# Patient Record
Sex: MALE | Race: WHITE | NOT HISPANIC OR LATINO | Employment: OTHER | RURAL
[De-identification: names, ages, dates, MRNs, and addresses within clinical notes are randomized per-mention and may not be internally consistent; named-entity substitution may affect disease eponyms.]

---

## 2020-11-15 ENCOUNTER — HISTORICAL (OUTPATIENT)
Dept: ADMINISTRATIVE | Facility: HOSPITAL | Age: 40
End: 2020-11-15

## 2021-06-15 ENCOUNTER — HOSPITAL ENCOUNTER (EMERGENCY)
Facility: HOSPITAL | Age: 41
Discharge: HOME | End: 2021-06-15

## 2021-06-15 VITALS
DIASTOLIC BLOOD PRESSURE: 80 MMHG | RESPIRATION RATE: 17 BRPM | TEMPERATURE: 98 F | SYSTOLIC BLOOD PRESSURE: 108 MMHG | BODY MASS INDEX: 27.28 KG/M2 | HEIGHT: 68 IN | OXYGEN SATURATION: 95 % | HEART RATE: 84 BPM | WEIGHT: 180 LBS

## 2021-06-15 DIAGNOSIS — L08.9 SOFT TISSUE INFECTION: Primary | ICD-10-CM

## 2021-06-15 LAB
BASOPHILS # BLD AUTO: 0.1 K/UL (ref 0–0.2)
BASOPHILS NFR BLD AUTO: 1.6 % (ref 0–1)
DIFFERENTIAL METHOD BLD: ABNORMAL
EOSINOPHIL # BLD AUTO: 0.46 K/UL (ref 0–0.5)
EOSINOPHIL NFR BLD AUTO: 7.1 % (ref 1–4)
ERYTHROCYTE [DISTWIDTH] IN BLOOD BY AUTOMATED COUNT: 12.8 % (ref 11.5–14.5)
HCT VFR BLD AUTO: 46.2 % (ref 40–54)
HGB BLD-MCNC: 16 G/DL (ref 13.5–18)
LYMPHOCYTES # BLD AUTO: 1.98 K/UL (ref 1–4.8)
LYMPHOCYTES NFR BLD AUTO: 30.7 % (ref 27–41)
MCH RBC QN AUTO: 31.9 PG (ref 27–31)
MCHC RBC AUTO-ENTMCNC: 34.6 G/DL (ref 32–36)
MCV RBC AUTO: 92.2 FL (ref 80–96)
MONOCYTES # BLD AUTO: 0.67 K/UL (ref 0–0.8)
MONOCYTES NFR BLD AUTO: 10.4 % (ref 2–6)
MPC BLD CALC-MCNC: 10.1 FL (ref 9.4–12.4)
NEUTROPHILS # BLD AUTO: 3.24 K/UL (ref 1.8–7.7)
NEUTROPHILS NFR BLD AUTO: 50.2 % (ref 53–65)
PLATELET # BLD AUTO: 231 K/UL (ref 150–400)
RBC # BLD AUTO: 5.01 M/UL (ref 4.6–6.2)
WBC # BLD AUTO: 6.45 K/UL (ref 4.5–11)

## 2021-06-15 PROCEDURE — 85025 COMPLETE CBC W/AUTO DIFF WBC: CPT | Performed by: FAMILY MEDICINE

## 2021-06-15 PROCEDURE — 99283 EMERGENCY DEPT VISIT LOW MDM: CPT | Mod: ,,, | Performed by: FAMILY MEDICINE

## 2021-06-15 PROCEDURE — 36415 COLL VENOUS BLD VENIPUNCTURE: CPT | Performed by: FAMILY MEDICINE

## 2021-06-15 PROCEDURE — 25000003 PHARM REV CODE 250: Performed by: FAMILY MEDICINE

## 2021-06-15 PROCEDURE — 99283 PR EMERGENCY DEPT VISIT,LEVEL III: ICD-10-PCS | Mod: ,,, | Performed by: FAMILY MEDICINE

## 2021-06-15 PROCEDURE — 96372 THER/PROPH/DIAG INJ SC/IM: CPT

## 2021-06-15 PROCEDURE — 99284 EMERGENCY DEPT VISIT MOD MDM: CPT | Mod: 25

## 2021-06-15 RX ORDER — CLINDAMYCIN HYDROCHLORIDE 150 MG/1
300 CAPSULE ORAL 4 TIMES DAILY
Qty: 56 CAPSULE | Refills: 0 | Status: SHIPPED | OUTPATIENT
Start: 2021-06-15 | End: 2021-06-22

## 2021-06-15 RX ORDER — CLINDAMYCIN PHOSPHATE 150 MG/ML
600 INJECTION, SOLUTION INTRAVENOUS
Status: COMPLETED | OUTPATIENT
Start: 2021-06-15 | End: 2021-06-15

## 2021-06-15 RX ADMIN — CLINDAMYCIN PHOSPHATE 600 MG: 150 INJECTION, SOLUTION INTRAMUSCULAR; INTRAVENOUS at 08:06

## 2021-08-23 ENCOUNTER — HOSPITAL ENCOUNTER (EMERGENCY)
Facility: HOSPITAL | Age: 41
Discharge: HOME | End: 2021-08-23

## 2021-08-23 VITALS
WEIGHT: 185 LBS | SYSTOLIC BLOOD PRESSURE: 135 MMHG | HEART RATE: 86 BPM | RESPIRATION RATE: 20 BRPM | TEMPERATURE: 98 F | OXYGEN SATURATION: 96 % | DIASTOLIC BLOOD PRESSURE: 87 MMHG | HEIGHT: 68 IN | BODY MASS INDEX: 28.04 KG/M2

## 2021-08-23 DIAGNOSIS — A64 SEXUALLY TRANSMITTED DISEASE (STD): Primary | ICD-10-CM

## 2021-08-23 PROCEDURE — 99283 EMERGENCY DEPT VISIT LOW MDM: CPT | Mod: ,,, | Performed by: FAMILY MEDICINE

## 2021-08-23 PROCEDURE — 99284 EMERGENCY DEPT VISIT MOD MDM: CPT

## 2021-08-23 PROCEDURE — 96372 THER/PROPH/DIAG INJ SC/IM: CPT

## 2021-08-23 PROCEDURE — 63600175 PHARM REV CODE 636 W HCPCS: Performed by: FAMILY MEDICINE

## 2021-08-23 PROCEDURE — 25000003 PHARM REV CODE 250: Performed by: FAMILY MEDICINE

## 2021-08-23 PROCEDURE — 99283 PR EMERGENCY DEPT VISIT,LEVEL III: ICD-10-PCS | Mod: ,,, | Performed by: FAMILY MEDICINE

## 2021-08-23 RX ORDER — CEFTRIAXONE 1 G/1
1 INJECTION, POWDER, FOR SOLUTION INTRAMUSCULAR; INTRAVENOUS ONCE
Status: COMPLETED | OUTPATIENT
Start: 2021-08-23 | End: 2021-08-23

## 2021-08-23 RX ORDER — METRONIDAZOLE 500 MG/1
2000 TABLET ORAL ONCE
Qty: 4 TABLET | Refills: 0 | Status: SHIPPED | OUTPATIENT
Start: 2021-08-23 | End: 2021-08-23

## 2021-08-23 RX ORDER — LIDOCAINE HYDROCHLORIDE 10 MG/ML
2.1 INJECTION INFILTRATION; PERINEURAL ONCE
Status: COMPLETED | OUTPATIENT
Start: 2021-08-23 | End: 2021-08-23

## 2021-08-23 RX ORDER — VALACYCLOVIR HYDROCHLORIDE 1 G/1
1000 TABLET, FILM COATED ORAL EVERY 12 HOURS
Qty: 14 TABLET | Refills: 0 | Status: SHIPPED | OUTPATIENT
Start: 2021-08-23 | End: 2024-03-05

## 2021-08-23 RX ORDER — KETOROLAC TROMETHAMINE 30 MG/ML
60 INJECTION, SOLUTION INTRAMUSCULAR; INTRAVENOUS
Status: COMPLETED | OUTPATIENT
Start: 2021-08-23 | End: 2021-08-23

## 2021-08-23 RX ORDER — DOXYCYCLINE 100 MG/1
100 CAPSULE ORAL EVERY 12 HOURS
Qty: 28 CAPSULE | Refills: 0 | Status: SHIPPED | OUTPATIENT
Start: 2021-08-23 | End: 2021-09-06

## 2021-08-23 RX ADMIN — CEFTRIAXONE 1 G: 1 INJECTION, POWDER, FOR SOLUTION INTRAMUSCULAR; INTRAVENOUS at 08:08

## 2021-08-23 RX ADMIN — LIDOCAINE HYDROCHLORIDE 2.1 ML: 10 INJECTION, SOLUTION INFILTRATION; PERINEURAL at 08:08

## 2021-08-23 RX ADMIN — KETOROLAC TROMETHAMINE 60 MG: 30 INJECTION, SOLUTION INTRAMUSCULAR; INTRAVENOUS at 08:08

## 2021-08-24 ENCOUNTER — TELEPHONE (OUTPATIENT)
Dept: EMERGENCY MEDICINE | Facility: HOSPITAL | Age: 41
End: 2021-08-24

## 2022-03-31 ENCOUNTER — HOSPITAL ENCOUNTER (EMERGENCY)
Facility: HOSPITAL | Age: 42
Discharge: HOME | End: 2022-04-01
Payer: COMMERCIAL

## 2022-03-31 DIAGNOSIS — H57.12 LEFT EYE PAIN: Primary | ICD-10-CM

## 2022-03-31 PROCEDURE — 99283 EMERGENCY DEPT VISIT LOW MDM: CPT

## 2022-03-31 PROCEDURE — 99283 EMERGENCY DEPT VISIT LOW MDM: CPT | Mod: ,,,

## 2022-03-31 PROCEDURE — 99283 PR EMERGENCY DEPT VISIT,LEVEL III: ICD-10-PCS | Mod: ,,,

## 2022-04-01 ENCOUNTER — TELEPHONE (OUTPATIENT)
Dept: EMERGENCY MEDICINE | Facility: HOSPITAL | Age: 42
End: 2022-04-01
Payer: COMMERCIAL

## 2022-04-01 VITALS
HEIGHT: 68 IN | HEART RATE: 76 BPM | OXYGEN SATURATION: 98 % | RESPIRATION RATE: 20 BRPM | DIASTOLIC BLOOD PRESSURE: 85 MMHG | BODY MASS INDEX: 29.55 KG/M2 | SYSTOLIC BLOOD PRESSURE: 129 MMHG | TEMPERATURE: 98 F | WEIGHT: 195 LBS

## 2022-04-01 PROCEDURE — 25000003 PHARM REV CODE 250

## 2022-04-01 RX ORDER — ERYTHROMYCIN 5 MG/G
OINTMENT OPHTHALMIC
Qty: 1 EACH | Refills: 0 | Status: SHIPPED | OUTPATIENT
Start: 2022-04-01 | End: 2022-09-12 | Stop reason: CLARIF

## 2022-04-01 RX ORDER — TETRACAINE HYDROCHLORIDE 5 MG/ML
2 SOLUTION OPHTHALMIC
Status: COMPLETED | OUTPATIENT
Start: 2022-04-01 | End: 2022-04-01

## 2022-04-01 RX ORDER — ERYTHROMYCIN 5 MG/G
OINTMENT OPHTHALMIC
Status: COMPLETED | OUTPATIENT
Start: 2022-04-01 | End: 2022-04-01

## 2022-04-01 RX ORDER — HYDROCODONE BITARTRATE AND ACETAMINOPHEN 5; 325 MG/1; MG/1
1 TABLET ORAL
Status: COMPLETED | OUTPATIENT
Start: 2022-04-01 | End: 2022-04-01

## 2022-04-01 RX ADMIN — ERYTHROMYCIN: 5 OINTMENT OPHTHALMIC at 12:04

## 2022-04-01 RX ADMIN — HYDROCODONE BITARTRATE AND ACETAMINOPHEN 1 TABLET: 5; 325 TABLET ORAL at 01:04

## 2022-04-01 RX ADMIN — TETRACAINE HYDROCHLORIDE 2 DROP: 5 SOLUTION OPHTHALMIC at 12:04

## 2022-04-01 RX ADMIN — FLUORESCEIN SODIUM 1 EACH: 1 STRIP OPHTHALMIC at 12:04

## 2022-04-01 NOTE — ED TRIAGE NOTES
Patient works in an auto body shop, reports no injury or foreign body to eye, no issues when he went to bed at 2000, woke 20-25 minutes ago with eye burning an d loss of vision, reports it feels like something is cutting his eye

## 2022-04-01 NOTE — DISCHARGE INSTRUCTIONS
Avoid rubbing eye. Follow-up with ophthalmology tomorrow as discussed. Return to Emergency Department for any new or worsening symptoms.

## 2022-04-01 NOTE — ED PROVIDER NOTES
"Encounter Date: 3/31/2022       History     Chief Complaint   Patient presents with    Eye Problem     Left eye pain that woke him 20 minutes ago, reports no known injury or foreign body, reports he can only see red in that eye     40 yo WM presents to ED with c/o left eye pain and foreign body sensation that started 30 mins PTA. Reports he was asleep when pain woke him up. He states he sees "red" out of left eye and nothing else. 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 States he was wearing eye protective equipment. He wears glasses at baseline and does not have glasses with him in ED. He is not wearing contacts at present. He denies any headache, N/V.     The history is provided by the patient.     Review of patient's allergies indicates:  No Known Allergies  Past Medical History:   Diagnosis Date    Seizures      Past Surgical History:   Procedure Laterality Date    FACIAL RECONSTRUCTION SURGERY       History reviewed. No pertinent family history.  Social History     Tobacco Use    Smoking status: Current Every Day Smoker     Packs/day: 0.50    Smokeless tobacco: Never Used   Substance Use Topics    Alcohol use: Yes     Comment: occasionally    Drug use: Never     Review of Systems   Constitutional: Negative for fever.   HENT: Negative for sore throat.    Eyes: Positive for pain and visual disturbance.   Respiratory: Negative for shortness of breath.    Cardiovascular: Negative for chest pain.   Gastrointestinal: Negative for nausea.   Genitourinary: Negative for dysuria.   Musculoskeletal: Negative for back pain.   Skin: Negative for rash.   Neurological: Negative for weakness.   Hematological: Does not bruise/bleed easily.       Physical Exam     Initial Vitals [03/31/22 2349]   BP Pulse Resp Temp SpO2   (!) 137/98 85 20 97.9 °F (36.6 °C) 96 %      MAP       --         Physical Exam    Vitals reviewed.  Constitutional: He appears well-developed and well-nourished.   Eyes: EOM are normal. Pupils are equal, round, and " reactive to light. Lids are everted and swept, no foreign bodies found. Left conjunctiva is injected.       Neck:   Normal range of motion.  Cardiovascular: Normal rate, regular rhythm and normal heart sounds.   Pulmonary/Chest: Breath sounds normal. No respiratory distress.   Musculoskeletal:         General: No tenderness or edema. Normal range of motion.      Cervical back: Normal range of motion.     Neurological: He is alert and oriented to person, place, and time. He has normal strength.   Skin: Skin is warm and dry.   Psychiatric: He has a normal mood and affect.         Medical Screening Exam   See Full Note    ED Course   Procedures  Labs Reviewed - No data to display       Imaging Results    None          Medications   TETRAcaine HCl (PF) 0.5 % Drop 2 drop (2 drops Left Eye Given 4/1/22 0004)   fluorescein ophthalmic strip 1 each (1 each Left Eye Given 4/1/22 0004)   erythromycin 5 mg/gram (0.5 %) ophthalmic ointment ( Left Eye Given 4/1/22 0040)   HYDROcodone-acetaminophen 5-325 mg per tablet 1 tablet (1 tablet Oral Given 4/1/22 0107)     Medical Decision Making:   ED Management:  The left eye was numbed with tetracaine and examined under woods lamp with fluorescein. Woods lamp in ED is not working to full extent. There was no obvious sign of corneal abrasion visualized. Patient reports after tetracaine was administered, the pain was relieved and he was able to open and see out of his left eye again. He continued to report a foreign body sensation to the upper, outer portion of the left eye under the left eyelid. Patient wears glasses at baseline and does not have glasses with him in ED, so visual acuity is unreliable. However, he is now able to see individuals in the room and answered appropriately when asked how many fingers were held in front of him.  The eye was irrigated with eye rinse solution in ED. Erythromycin ointment and protective eye patch was placed onto left eye. Will have patient f/u with  ophthalmology tomorrow.  Home care and strict return to ED precautions discussed. Patient voiced understanding.                   Clinical Impression:   Final diagnoses:  [H57.12] Left eye pain (Primary)          ED Disposition Condition    Discharge Stable        ED Prescriptions     Medication Sig Dispense Start Date End Date Auth. Provider    erythromycin (ROMYCIN) ophthalmic ointment Place a 1/2 inch ribbon of ointment into the left lower eyelid 3 times daily. 1 each 4/1/2022  JOSÉ ANTONIO Quinonez        Follow-up Information    None          JOSÉ ANTONIO Quinonez  04/01/22 1292

## 2022-09-12 ENCOUNTER — HOSPITAL ENCOUNTER (EMERGENCY)
Facility: HOSPITAL | Age: 42
Discharge: HOME | End: 2022-09-12

## 2022-09-12 VITALS
BODY MASS INDEX: 30.01 KG/M2 | TEMPERATURE: 98 F | HEIGHT: 68 IN | SYSTOLIC BLOOD PRESSURE: 129 MMHG | OXYGEN SATURATION: 97 % | RESPIRATION RATE: 18 BRPM | HEART RATE: 79 BPM | WEIGHT: 198 LBS | DIASTOLIC BLOOD PRESSURE: 76 MMHG

## 2022-09-12 DIAGNOSIS — K50.00 TERMINAL ILEITIS WITHOUT COMPLICATION: Primary | ICD-10-CM

## 2022-09-12 DIAGNOSIS — R10.84 GENERALIZED ABDOMINAL PAIN: ICD-10-CM

## 2022-09-12 LAB
ALBUMIN SERPL BCP-MCNC: 4.1 G/DL (ref 3.5–5)
ALBUMIN/GLOB SERPL: 1.2 {RATIO}
ALP SERPL-CCNC: 81 U/L (ref 45–115)
ALT SERPL W P-5'-P-CCNC: 33 U/L (ref 16–61)
AMYLASE SERPL-CCNC: 44 U/L (ref 25–115)
ANION GAP SERPL CALCULATED.3IONS-SCNC: 12 MMOL/L (ref 7–16)
AST SERPL W P-5'-P-CCNC: 18 U/L (ref 15–37)
BASOPHILS # BLD AUTO: 0.04 K/UL (ref 0–0.2)
BASOPHILS NFR BLD AUTO: 0.3 % (ref 0–1)
BILIRUB SERPL-MCNC: 0.6 MG/DL (ref ?–1.2)
BILIRUB UR QL STRIP: NEGATIVE
BUN SERPL-MCNC: 13 MG/DL (ref 7–18)
BUN/CREAT SERPL: 10 (ref 6–20)
CALCIUM SERPL-MCNC: 9.2 MG/DL (ref 8.5–10.1)
CHLORIDE SERPL-SCNC: 104 MMOL/L (ref 98–107)
CLARITY UR: CLEAR
CO2 SERPL-SCNC: 27 MMOL/L (ref 21–32)
COLOR UR: YELLOW
CREAT SERPL-MCNC: 1.29 MG/DL (ref 0.7–1.3)
DIFFERENTIAL METHOD BLD: ABNORMAL
EGFR (NO RACE VARIABLE) (RUSH/TITUS): 72 ML/MIN/1.73M²
EOSINOPHIL # BLD AUTO: 0.26 K/UL (ref 0–0.5)
EOSINOPHIL NFR BLD AUTO: 2.3 % (ref 1–4)
ERYTHROCYTE [DISTWIDTH] IN BLOOD BY AUTOMATED COUNT: 12.7 % (ref 11.5–14.5)
GLOBULIN SER-MCNC: 3.3 G/DL (ref 2–4)
GLUCOSE SERPL-MCNC: 119 MG/DL (ref 74–106)
GLUCOSE UR STRIP-MCNC: NEGATIVE MG/DL
HCT VFR BLD AUTO: 47.4 % (ref 40–54)
HGB BLD-MCNC: 16.8 G/DL (ref 13.5–18)
KETONES UR STRIP-SCNC: NEGATIVE MG/DL
LACTATE SERPL-SCNC: 1.1 MMOL/L (ref 0.4–2)
LEUKOCYTE ESTERASE UR QL STRIP: NEGATIVE
LIPASE SERPL-CCNC: 122 U/L (ref 73–393)
LYMPHOCYTES # BLD AUTO: 1.04 K/UL (ref 1–4.8)
LYMPHOCYTES NFR BLD AUTO: 9.1 % (ref 27–41)
MCH RBC QN AUTO: 32 PG (ref 27–31)
MCHC RBC AUTO-ENTMCNC: 35.4 G/DL (ref 32–36)
MCV RBC AUTO: 90.3 FL (ref 80–96)
MONOCYTES # BLD AUTO: 0.86 K/UL (ref 0–0.8)
MONOCYTES NFR BLD AUTO: 7.5 % (ref 2–6)
MPC BLD CALC-MCNC: 10.4 FL (ref 9.4–12.4)
NEUTROPHILS # BLD AUTO: 9.27 K/UL (ref 1.8–7.7)
NEUTROPHILS NFR BLD AUTO: 80.8 % (ref 53–65)
NITRITE UR QL STRIP: NEGATIVE
OCCULT BLOOD: NEGATIVE
PH UR STRIP: 5.5 PH UNITS
PLATELET # BLD AUTO: 249 K/UL (ref 150–400)
POTASSIUM SERPL-SCNC: 4.4 MMOL/L (ref 3.5–5.1)
PROT SERPL-MCNC: 7.4 G/DL (ref 6.4–8.2)
PROT UR QL STRIP: NEGATIVE
RBC # BLD AUTO: 5.25 M/UL (ref 4.6–6.2)
RBC # UR STRIP: NEGATIVE /UL
SODIUM SERPL-SCNC: 139 MMOL/L (ref 136–145)
SP GR UR STRIP: 1.02
UROBILINOGEN UR STRIP-ACNC: 0.2 MG/DL
WBC # BLD AUTO: 11.47 K/UL (ref 4.5–11)

## 2022-09-12 PROCEDURE — 96375 TX/PRO/DX INJ NEW DRUG ADDON: CPT

## 2022-09-12 PROCEDURE — 82271 OCCULT BLOOD OTHER SOURCES: CPT | Performed by: NURSE PRACTITIONER

## 2022-09-12 PROCEDURE — 85025 COMPLETE CBC W/AUTO DIFF WBC: CPT | Performed by: NURSE PRACTITIONER

## 2022-09-12 PROCEDURE — 96365 THER/PROPH/DIAG IV INF INIT: CPT | Mod: 59

## 2022-09-12 PROCEDURE — 83690 ASSAY OF LIPASE: CPT | Performed by: NURSE PRACTITIONER

## 2022-09-12 PROCEDURE — 63600175 PHARM REV CODE 636 W HCPCS: Performed by: NURSE PRACTITIONER

## 2022-09-12 PROCEDURE — 99285 EMERGENCY DEPT VISIT HI MDM: CPT | Mod: 25

## 2022-09-12 PROCEDURE — 82150 ASSAY OF AMYLASE: CPT | Performed by: NURSE PRACTITIONER

## 2022-09-12 PROCEDURE — 83605 ASSAY OF LACTIC ACID: CPT | Performed by: NURSE PRACTITIONER

## 2022-09-12 PROCEDURE — 80053 COMPREHEN METABOLIC PANEL: CPT | Performed by: NURSE PRACTITIONER

## 2022-09-12 PROCEDURE — 99284 PR EMERGENCY DEPT VISIT,LEVEL IV: ICD-10-PCS | Mod: ,,, | Performed by: NURSE PRACTITIONER

## 2022-09-12 PROCEDURE — 96361 HYDRATE IV INFUSION ADD-ON: CPT

## 2022-09-12 PROCEDURE — 36415 COLL VENOUS BLD VENIPUNCTURE: CPT | Performed by: NURSE PRACTITIONER

## 2022-09-12 PROCEDURE — 25000003 PHARM REV CODE 250: Performed by: NURSE PRACTITIONER

## 2022-09-12 PROCEDURE — 25500020 PHARM REV CODE 255: Performed by: NURSE PRACTITIONER

## 2022-09-12 PROCEDURE — 99284 EMERGENCY DEPT VISIT MOD MDM: CPT | Mod: ,,, | Performed by: NURSE PRACTITIONER

## 2022-09-12 PROCEDURE — 81003 URINALYSIS AUTO W/O SCOPE: CPT | Performed by: NURSE PRACTITIONER

## 2022-09-12 RX ORDER — AMOXICILLIN AND CLAVULANATE POTASSIUM 875; 125 MG/1; MG/1
1 TABLET, FILM COATED ORAL 2 TIMES DAILY
Qty: 20 TABLET | Refills: 0 | Status: SHIPPED | OUTPATIENT
Start: 2022-09-12 | End: 2022-09-22

## 2022-09-12 RX ORDER — KETOROLAC TROMETHAMINE 30 MG/ML
15 INJECTION, SOLUTION INTRAMUSCULAR; INTRAVENOUS
Status: COMPLETED | OUTPATIENT
Start: 2022-09-12 | End: 2022-09-12

## 2022-09-12 RX ORDER — ONDANSETRON 2 MG/ML
4 INJECTION INTRAMUSCULAR; INTRAVENOUS
Status: COMPLETED | OUTPATIENT
Start: 2022-09-12 | End: 2022-09-12

## 2022-09-12 RX ADMIN — ONDANSETRON 4 MG: 2 INJECTION INTRAMUSCULAR; INTRAVENOUS at 11:09

## 2022-09-12 RX ADMIN — IOPAMIDOL 100 ML: 755 INJECTION, SOLUTION INTRAVENOUS at 12:09

## 2022-09-12 RX ADMIN — PIPERACILLIN AND TAZOBACTAM 4.5 G: 4; .5 INJECTION, POWDER, LYOPHILIZED, FOR SOLUTION INTRAVENOUS at 01:09

## 2022-09-12 RX ADMIN — SODIUM CHLORIDE 1000 ML: 9 INJECTION, SOLUTION INTRAVENOUS at 11:09

## 2022-09-12 RX ADMIN — KETOROLAC TROMETHAMINE 15 MG: 30 INJECTION, SOLUTION INTRAMUSCULAR; INTRAVENOUS at 11:09

## 2022-09-12 NOTE — DISCHARGE INSTRUCTIONS
Take Augmentin as prescribed for all 10 days. As discussed, you will need to follow-up with gastroenterology to further evaluate for cause of your condition. A referral has been sent to Dr. Roland's office, so a representative should be contacting you with an appointment in the next day or two. Be sure and follow-up. If you develop fever, worsening pain, vomiting, or rectal bleeding, return to the ED for re-evaluation.

## 2022-09-12 NOTE — ED PROVIDER NOTES
Encounter Date: 9/12/2022       History     Chief Complaint   Patient presents with    Abdominal Pain     C/o abd pain and back pain that started last night.     Presented with c/o generalized abd pain that started initially to left flank area yesterday while riding the lawn mowere and migrated to the abd over the course of this illness. States nausea with no vomiting or diarrhea. Last BM was yesterday and was normal. States feels like he's having difficulty urinating since pain began. Denies hematuria. States has strong FH of renal stones but he has not had any himself in the past. Denies scrotal or testicular pain. Denies injury or history of similar pain.    Review of patient's allergies indicates:  No Known Allergies  Past Medical History:   Diagnosis Date    Seizures      Past Surgical History:   Procedure Laterality Date    FACIAL RECONSTRUCTION SURGERY       History reviewed. No pertinent family history.  Social History     Tobacco Use    Smoking status: Every Day     Packs/day: 0.50     Types: Cigarettes    Smokeless tobacco: Never   Substance Use Topics    Alcohol use: Yes     Comment: occasionally    Drug use: Never     Review of Systems   Constitutional:  Positive for appetite change. Negative for fever.   HENT: Negative.     Respiratory:  Negative for cough and shortness of breath.    Cardiovascular:  Negative for chest pain and leg swelling.   Gastrointestinal:  Positive for abdominal pain and nausea. Negative for abdominal distention, diarrhea and vomiting.   Genitourinary:  Positive for decreased urine volume, difficulty urinating, dysuria and flank pain. Negative for frequency, scrotal swelling and testicular pain.   Musculoskeletal:  Positive for back pain.   Skin: Negative.  Negative for rash.   Neurological: Negative.    Psychiatric/Behavioral: Negative.       Physical Exam     Initial Vitals [09/12/22 1100]   BP Pulse Resp Temp SpO2   (!) 136/96 88 18 97.5 °F (36.4 °C) 98 %      MAP       --          Physical Exam    Constitutional: He appears well-developed and well-nourished.   HENT:   Head: Normocephalic.   Right Ear: External ear normal.   Left Ear: External ear normal.   Nose: Nose normal.   Mouth/Throat: Oropharynx is clear and moist.   Eyes: Conjunctivae and EOM are normal. Pupils are equal, round, and reactive to light.   Neck: Neck supple.   Normal range of motion.  Cardiovascular:  Normal rate, regular rhythm, normal heart sounds and intact distal pulses.           Pulmonary/Chest: Breath sounds normal. No respiratory distress. He has no wheezes. He has no rhonchi. He has no rales.   Abdominal: Abdomen is soft. Bowel sounds are normal. There is abdominal tenderness (exquisite generalized tenderness with guarding). There is rebound and guarding.   Musculoskeletal:      Cervical back: Normal range of motion and neck supple.         Medical Screening Exam   See Full Note    ED Course   Procedures  Labs Reviewed   COMPREHENSIVE METABOLIC PANEL - Abnormal; Notable for the following components:       Result Value    Glucose 119 (*)     All other components within normal limits   CBC WITH DIFFERENTIAL - Abnormal; Notable for the following components:    WBC 11.47 (*)     MCH 32.0 (*)     Neutrophils % 80.8 (*)     Lymphocytes % 9.1 (*)     Neutrophils, Abs 9.27 (*)     Monocytes % 7.5 (*)     Monocytes, Absolute 0.86 (*)     All other components within normal limits   LACTIC ACID, PLASMA - Normal   LIPASE - Normal   AMYLASE - Normal   OCCULT BLOOD X 1, STOOL - Normal   URINALYSIS, REFLEX TO URINE CULTURE   CBC W/ AUTO DIFFERENTIAL    Narrative:     The following orders were created for panel order CBC auto differential.  Procedure                               Abnormality         Status                     ---------                               -----------         ------                     CBC with Differential[[id number]]        Abnormal            Final result                 Please view results for  these tests on the individual orders.          Imaging Results              CT Abdomen Pelvis With Contrast (Final result)  Result time 09/12/22 12:37:28      Final result by Sanchez Mendez DO (09/12/22 12:37:28)                   Impression:      Acute terminal ileitis, findings which can be seen in inflammatory bowel disease.    Normal appendix.      Electronically signed by: Sanchez Mendez  Date:    09/12/2022  Time:    12:37               Narrative:    EXAMINATION:  CT ABDOMEN PELVIS WITH CONTRAST    CLINICAL HISTORY:  Abdominal pain, acute, nonlocalized;    COMPARISON:  2019    TECHNIQUE:  CT ABDOMEN PELVIS WITH CONTRAST    FINDINGS:  Lower lobes: Clear.    Cardiac: No effusion.    Abdomen:    Hepatobiliary/gallbladder: Normal    Spleen: Normal    Pancreas: Normal    Adrenal/Genitourinary system: Normal    Bowel and Mesentery: Abnormal mucosal thickening and mucosal enhancement of the terminal ileum and distal jejunum within the right lower quadrant, paraspinal image 45.    Peritoneum: Multiple non-enlarged right lower quadrant lymph nodes.    Retroperitoneum: No enlarged lymph nodes.    Vasculature: Normal.    Reproductive: Normal.    Lymph nodes: No enlarged lymph nodes.    Abdominal wall: Normal.    Osseous structures: Normal.                                    X-Rays:   Independently Interpreted Readings:   Abdomen: Cardiac: No effusion.     Hepatobiliary/gallbladder: Normal  Spleen: Normal  Pancreas: Normal  Adrenal/Genitourinary system: Normal  Bowel and Mesentery: Abnormal mucosal thickening and mucosal enhancement of the terminal ileum and distal jejunum within the right lower quadrant, paraspinal image 45.  Peritoneum: Multiple non-enlarged right lower quadrant lymph nodes.  Retroperitoneum: No enlarged lymph nodes.  Vasculature: Normal.  Reproductive: Normal.  Lymph nodes: No enlarged lymph nodes.  Abdominal wall: Normal.  Osseous structures: Normal   Medications   sodium chloride 0.9% bolus  1,000 mL (0 mLs Intravenous Stopped 9/12/22 1307)   ondansetron injection 4 mg (4 mg Intravenous Given 9/12/22 1139)   ketorolac injection 15 mg (15 mg Intravenous Given 9/12/22 1142)   iopamidoL (ISOVUE-370) injection 100 mL (100 mLs Intravenous Given 9/12/22 1222)   piperacillin-tazobactam (ZOSYN) 4.5 g in sodium chloride 0.9 % 100 mL IVPB (MB+) (0 g Intravenous Stopped 9/12/22 1345)     Medical Decision Making:   ED Management:  WBC 29215. CT shows evidence of terminal ileitis. Lactic acid 1.1. Afebrile. Ondansetron and toradol IV given for nausea and pain. NS bolus given. States feeling better. Able to geoffrey po intake. Instructed pt on need for further evaluation for crohn's or ulcerative colitis. Referral to GI , Dr. Roland. Zosyn 4.5 GM IVPB given in the ED. Augmentin po for OP.                 Clinical Impression:   Final diagnoses:  [R10.84] Generalized abdominal pain  [K50.00] Terminal ileitis without complication (Primary)      ED Disposition Condition    Discharge Stable          ED Prescriptions       Medication Sig Dispense Start Date End Date Auth. Provider    amoxicillin-clavulanate 875-125mg (AUGMENTIN) 875-125 mg per tablet Take 1 tablet by mouth 2 (two) times daily. for 10 days 20 tablet 9/12/2022 9/22/2022 Karli Leahy NP          Follow-up Information       Follow up With Specialties Details Why Contact Info    [facility] - Emergency Department Emergency Medicine  If symptoms worsen [address] [facility] [id number] [phone]    ALPESH Roland MD Gastroenterology  You will be contacted for an appointment. [address] [facility] [address]  [phone]               Karli Leahy NP  09/12/22 7189

## 2022-09-13 NOTE — ADDENDUM NOTE
Encounter addended by: Carlene Campbell on: 9/13/2022 6:47 PM   Actions taken: SmartForm saved, Flowsheet accepted

## 2023-01-02 ENCOUNTER — HOSPITAL ENCOUNTER (EMERGENCY)
Facility: HOSPITAL | Age: 43
Discharge: HOME | End: 2023-01-02
Payer: COMMERCIAL

## 2023-01-02 VITALS
SYSTOLIC BLOOD PRESSURE: 131 MMHG | WEIGHT: 190 LBS | TEMPERATURE: 99 F | RESPIRATION RATE: 16 BRPM | OXYGEN SATURATION: 98 % | BODY MASS INDEX: 28.79 KG/M2 | HEART RATE: 96 BPM | HEIGHT: 68 IN | DIASTOLIC BLOOD PRESSURE: 86 MMHG

## 2023-01-02 DIAGNOSIS — S62.346A CLOSED NONDISPLACED FRACTURE OF BASE OF FIFTH METACARPAL BONE OF RIGHT HAND, INITIAL ENCOUNTER: Primary | ICD-10-CM

## 2023-01-02 DIAGNOSIS — S69.91XA INJURY OF RIGHT HAND: ICD-10-CM

## 2023-01-02 PROCEDURE — 99284 PR EMERGENCY DEPT VISIT,LEVEL IV: ICD-10-PCS | Mod: ,,, | Performed by: NURSE PRACTITIONER

## 2023-01-02 PROCEDURE — 99284 EMERGENCY DEPT VISIT MOD MDM: CPT | Mod: ,,, | Performed by: NURSE PRACTITIONER

## 2023-01-02 PROCEDURE — 99283 EMERGENCY DEPT VISIT LOW MDM: CPT

## 2023-01-02 PROCEDURE — 25000003 PHARM REV CODE 250: Performed by: NURSE PRACTITIONER

## 2023-01-02 RX ORDER — HYDROCODONE BITARTRATE AND ACETAMINOPHEN 5; 325 MG/1; MG/1
1 TABLET ORAL EVERY 6 HOURS PRN
Qty: 6 TABLET | Refills: 0 | Status: SHIPPED | OUTPATIENT
Start: 2023-01-02 | End: 2023-01-05 | Stop reason: SDUPTHER

## 2023-01-02 RX ORDER — IBUPROFEN 200 MG
800 TABLET ORAL
Status: COMPLETED | OUTPATIENT
Start: 2023-01-02 | End: 2023-01-02

## 2023-01-02 RX ADMIN — IBUPROFEN 800 MG: 200 TABLET, FILM COATED ORAL at 08:01
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
## 2023-01-02 NOTE — DISCHARGE INSTRUCTIONS
Wear splint. Elevate right hand as much as possible to help with swelling and pain. Apply ice pack to area for 20 minutes 4-6 times per day. Take Ibuprofen 800 mg every 8 hours for pain. Take Norco 5/325 mg every 6 hours as needed for more severe pain. A referral has been sent to office of Dr. Inderjit Patel, Orthopedist. An office staff member will contact you with an appointment. Make sure you monitor circulation in your fingers. If any concern for decrease in circulation, remove splint and return to the ED immediately.

## 2023-01-05 ENCOUNTER — HOSPITAL ENCOUNTER (OUTPATIENT)
Dept: RADIOLOGY | Facility: HOSPITAL | Age: 43
Discharge: HOME | End: 2023-01-05
Attending: ORTHOPAEDIC SURGERY
Payer: COMMERCIAL

## 2023-01-05 ENCOUNTER — OFFICE VISIT (OUTPATIENT)
Dept: ORTHOPEDICS | Facility: CLINIC | Age: 43
End: 2023-01-05
Payer: COMMERCIAL

## 2023-01-05 DIAGNOSIS — S62.346A CLOSED NONDISPLACED FRACTURE OF BASE OF FIFTH METACARPAL BONE OF RIGHT HAND, INITIAL ENCOUNTER: ICD-10-CM

## 2023-01-05 PROCEDURE — 26605 PR CLOSED RX METACARPAL FX,MANIP: ICD-10-PCS | Mod: S$PBB,RT,, | Performed by: ORTHOPAEDIC SURGERY

## 2023-01-05 PROCEDURE — 73130 XR HAND COMPLETE 3 VIEW RIGHT: ICD-10-PCS | Mod: 26,RT,, | Performed by: ORTHOPAEDIC SURGERY

## 2023-01-05 PROCEDURE — 99213 OFFICE O/P EST LOW 20 MIN: CPT | Mod: PBBFAC,25 | Performed by: ORTHOPAEDIC SURGERY

## 2023-01-05 PROCEDURE — 73130 X-RAY EXAM OF HAND: CPT | Mod: 26,RT,, | Performed by: ORTHOPAEDIC SURGERY

## 2023-01-05 PROCEDURE — 26605 TREAT METACARPAL FRACTURE: CPT | Mod: PBBFAC | Performed by: ORTHOPAEDIC SURGERY

## 2023-01-05 PROCEDURE — 99203 PR OFFICE/OUTPT VISIT, NEW, LEVL III, 30-44 MIN: ICD-10-PCS | Mod: S$PBB,57,, | Performed by: ORTHOPAEDIC SURGERY

## 2023-01-05 PROCEDURE — 26605 TREAT METACARPAL FRACTURE: CPT | Mod: S$PBB,RT,, | Performed by: ORTHOPAEDIC SURGERY

## 2023-01-05 PROCEDURE — 99203 OFFICE O/P NEW LOW 30 MIN: CPT | Mod: S$PBB,57,, | Performed by: ORTHOPAEDIC SURGERY

## 2023-01-05 PROCEDURE — 73130 X-RAY EXAM OF HAND: CPT | Mod: TC,RT

## 2023-01-05 RX ORDER — HYDROCODONE BITARTRATE AND ACETAMINOPHEN 5; 325 MG/1; MG/1
1 TABLET ORAL EVERY 6 HOURS PRN
Qty: 14 TABLET | Refills: 0 | Status: SHIPPED | OUTPATIENT
Start: 2023-01-05 | End: 2024-03-05
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
## 2023-01-26 ENCOUNTER — OFFICE VISIT (OUTPATIENT)
Dept: ORTHOPEDICS | Facility: CLINIC | Age: 43
End: 2023-01-26
Payer: COMMERCIAL

## 2023-01-26 ENCOUNTER — HOSPITAL ENCOUNTER (OUTPATIENT)
Dept: RADIOLOGY | Facility: HOSPITAL | Age: 43
Discharge: HOME | End: 2023-01-26
Attending: ORTHOPAEDIC SURGERY
Payer: COMMERCIAL

## 2023-01-26 DIAGNOSIS — S62.346A CLOSED NONDISPLACED FRACTURE OF BASE OF FIFTH METACARPAL BONE OF RIGHT HAND, INITIAL ENCOUNTER: ICD-10-CM

## 2023-01-26 DIAGNOSIS — Z09 FOLLOW-UP EXAMINATION, FOLLOWING OTHER SURGERY: Primary | ICD-10-CM

## 2023-01-26 PROCEDURE — 73130 XR HAND COMPLETE 3 VIEW RIGHT: ICD-10-PCS | Mod: 26,RT,, | Performed by: ORTHOPAEDIC SURGERY

## 2023-01-26 PROCEDURE — 29125 APPL SHORT ARM SPLINT STATIC: CPT | Mod: PBBFAC | Performed by: ORTHOPAEDIC SURGERY

## 2023-01-26 PROCEDURE — 29125 PR APPLY FOREARM SPLINT,STATIC: ICD-10-PCS | Mod: S$PBB,58,RT, | Performed by: ORTHOPAEDIC SURGERY

## 2023-01-26 PROCEDURE — 29125 APPL SHORT ARM SPLINT STATIC: CPT | Mod: S$PBB,58,RT, | Performed by: ORTHOPAEDIC SURGERY

## 2023-01-26 PROCEDURE — 73130 X-RAY EXAM OF HAND: CPT | Mod: TC,RT

## 2023-01-26 PROCEDURE — 73130 X-RAY EXAM OF HAND: CPT | Mod: 26,RT,, | Performed by: ORTHOPAEDIC SURGERY

## 2023-01-26 PROCEDURE — 99024 POSTOP FOLLOW-UP VISIT: CPT | Mod: ,,, | Performed by: ORTHOPAEDIC SURGERY

## 2023-01-26 PROCEDURE — 99212 OFFICE O/P EST SF 10 MIN: CPT | Mod: PBBFAC | Performed by: ORTHOPAEDIC SURGERY

## 2023-01-26 PROCEDURE — 99024 PR POST-OP FOLLOW-UP VISIT: ICD-10-PCS | Mod: ,,, | Performed by: ORTHOPAEDIC SURGERY

## 2023-01-26 NOTE — PROGRESS NOTES
CC:    Chief Complaint   Patient presents with    Follow-up     RT HAND CLOSSED 5TH MC FX (3 WEEKS)           Previos History :        History:  1/26/2023   Juan Luis Guzmán is a 42 y.o.  status post three-week follow-up right 5th metacarpal base fracture        PE:   Mildly tender today there good alignment of his knuckles      Radiology:  Right hand three views AP lateral oblique view 5th metacarpal base fracture new bone formation healing fracture good alignment        Ass/Plan:  New ulnar gutter splint conservatively placed him EZ wrap of do too much follow-up 3 weeks x-rays right hand out of the splint        Inderjit Patel III, MD    Subject to voice recognition errors,  transcription services are not allowed

## 2023-02-15 DIAGNOSIS — S62.346A CLOSED NONDISPLACED FRACTURE OF BASE OF FIFTH METACARPAL BONE OF RIGHT HAND, INITIAL ENCOUNTER: ICD-10-CM

## 2023-02-15 DIAGNOSIS — Z09 FOLLOW-UP EXAMINATION, FOLLOWING OTHER SURGERY: Primary | ICD-10-CM

## 2023-02-16 ENCOUNTER — OFFICE VISIT (OUTPATIENT)
Dept: ORTHOPEDICS | Facility: CLINIC | Age: 43
End: 2023-02-16
Payer: COMMERCIAL

## 2023-02-16 ENCOUNTER — HOSPITAL ENCOUNTER (OUTPATIENT)
Dept: RADIOLOGY | Facility: HOSPITAL | Age: 43
Discharge: HOME | End: 2023-02-16
Attending: ORTHOPAEDIC SURGERY
Payer: COMMERCIAL

## 2023-02-16 DIAGNOSIS — Z09 FOLLOW-UP EXAMINATION, FOLLOWING OTHER SURGERY: Primary | ICD-10-CM

## 2023-02-16 DIAGNOSIS — S62.346A CLOSED NONDISPLACED FRACTURE OF BASE OF FIFTH METACARPAL BONE OF RIGHT HAND, INITIAL ENCOUNTER: ICD-10-CM

## 2023-02-16 PROCEDURE — 73130 XR HAND COMPLETE 3 VIEW RIGHT: ICD-10-PCS | Mod: 26,RT,, | Performed by: ORTHOPAEDIC SURGERY

## 2023-02-16 PROCEDURE — 99024 PR POST-OP FOLLOW-UP VISIT: ICD-10-PCS | Mod: ,,, | Performed by: ORTHOPAEDIC SURGERY

## 2023-02-16 PROCEDURE — 99212 OFFICE O/P EST SF 10 MIN: CPT | Mod: PBBFAC | Performed by: ORTHOPAEDIC SURGERY

## 2023-02-16 PROCEDURE — 73130 X-RAY EXAM OF HAND: CPT | Mod: 26,RT,, | Performed by: ORTHOPAEDIC SURGERY

## 2023-02-16 PROCEDURE — 73130 X-RAY EXAM OF HAND: CPT | Mod: TC,RT

## 2023-02-16 PROCEDURE — 99024 POSTOP FOLLOW-UP VISIT: CPT | Mod: ,,, | Performed by: ORTHOPAEDIC SURGERY

## 2023-02-16 NOTE — PROGRESS NOTES
CC:    Chief Complaint   Patient presents with    Follow-up     RT HAND CLOSED 5TH MC FX 1/5 (6WKS)           Previos History :        History:  2/16/2023   Juan Luis Guzmán is a 42 y.o.  status post 6 weeks out right 5th metacarpal base fracture treated conservatively he has been in a ulnar gutter splint most recently        PE:   Skin looks good his fingers are stiff still has some tenderness over the fracture site in addition      Radiology:  Right hand three views AP lateral oblique view 5th metacarpal base fracture new bone formation progressively healing fracture good alignment        Ass/Plan:  Going to place him in a EZ wrap wrist brace still protect this area by 1 start working on motion of his fingers no pushing or pulling no heavy activities just gentle range of motion his fingers follow-up x-rays 3 weeks right hand        Inderjit Patel III, MD    Subject to voice recognition errors,  transcription services are not allowed

## 2023-03-08 DIAGNOSIS — S62.346A CLOSED NONDISPLACED FRACTURE OF BASE OF FIFTH METACARPAL BONE OF RIGHT HAND, INITIAL ENCOUNTER: Primary | ICD-10-CM

## 2023-03-09 ENCOUNTER — HOSPITAL ENCOUNTER (OUTPATIENT)
Dept: RADIOLOGY | Facility: HOSPITAL | Age: 43
Discharge: HOME | End: 2023-03-09
Attending: ORTHOPAEDIC SURGERY
Payer: COMMERCIAL

## 2023-03-09 ENCOUNTER — OFFICE VISIT (OUTPATIENT)
Dept: ORTHOPEDICS | Facility: CLINIC | Age: 43
End: 2023-03-09
Payer: COMMERCIAL

## 2023-03-09 DIAGNOSIS — Z09 FOLLOW-UP EXAMINATION, FOLLOWING OTHER SURGERY: Primary | ICD-10-CM

## 2023-03-09 DIAGNOSIS — S62.346A CLOSED NONDISPLACED FRACTURE OF BASE OF FIFTH METACARPAL BONE OF RIGHT HAND, INITIAL ENCOUNTER: ICD-10-CM

## 2023-03-09 PROCEDURE — 99024 POSTOP FOLLOW-UP VISIT: CPT | Mod: ,,, | Performed by: ORTHOPAEDIC SURGERY

## 2023-03-09 PROCEDURE — 73130 X-RAY EXAM OF HAND: CPT | Mod: 26,RT,, | Performed by: ORTHOPAEDIC SURGERY

## 2023-03-09 PROCEDURE — 73130 XR HAND COMPLETE 3 VIEW RIGHT: ICD-10-PCS | Mod: 26,RT,, | Performed by: ORTHOPAEDIC SURGERY

## 2023-03-09 PROCEDURE — 99212 OFFICE O/P EST SF 10 MIN: CPT | Mod: PBBFAC | Performed by: ORTHOPAEDIC SURGERY

## 2023-03-09 PROCEDURE — 73130 X-RAY EXAM OF HAND: CPT | Mod: TC,RT

## 2023-03-09 PROCEDURE — 99024 PR POST-OP FOLLOW-UP VISIT: ICD-10-PCS | Mod: ,,, | Performed by: ORTHOPAEDIC SURGERY
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
## 2024-03-05 ENCOUNTER — OFFICE VISIT (OUTPATIENT)
Dept: FAMILY MEDICINE | Facility: CLINIC | Age: 44
End: 2024-03-05

## 2024-03-05 VITALS
DIASTOLIC BLOOD PRESSURE: 80 MMHG | WEIGHT: 168.88 LBS | SYSTOLIC BLOOD PRESSURE: 126 MMHG | HEART RATE: 78 BPM | TEMPERATURE: 98 F | RESPIRATION RATE: 16 BRPM | BODY MASS INDEX: 25.59 KG/M2 | HEIGHT: 68 IN | OXYGEN SATURATION: 99 %

## 2024-03-05 DIAGNOSIS — A63.0 PENILE WART: ICD-10-CM

## 2024-03-05 DIAGNOSIS — B35.6 TINEA CRURIS: Primary | ICD-10-CM

## 2024-03-05 PROCEDURE — 99213 OFFICE O/P EST LOW 20 MIN: CPT | Mod: ,,, | Performed by: NURSE PRACTITIONER

## 2024-03-05 PROCEDURE — 1159F MED LIST DOCD IN RCRD: CPT | Mod: CPTII,,, | Performed by: NURSE PRACTITIONER

## 2024-03-05 PROCEDURE — 1160F RVW MEDS BY RX/DR IN RCRD: CPT | Mod: CPTII,,, | Performed by: NURSE PRACTITIONER

## 2024-03-05 PROCEDURE — 3008F BODY MASS INDEX DOCD: CPT | Mod: CPTII,,, | Performed by: NURSE PRACTITIONER

## 2024-03-05 NOTE — PROGRESS NOTES
JOSÉ ANTONIO Rosas   [facility]  [address] [facility] [place] [address]  [phone]      PATIENT NAME: Juan Luis Guzmán  : 1980  DATE: 3/5/24  MRN: [id number]      Billing Provider: JOSÉ ANTONIO Rosas  Level of Service: ND OFFICE/OUTPT VISIT, EST, LEVL III, 20-29 MIN  Patient PCP Information       Provider PCP Type    Primary Doctor No General            Reason for Visit / Chief Complaint: Rash (Has a rash on his right groin,started about a month ago.)       Update PCP  Update Chief Complaint         History of Present Illness / Problem Focused Workflow     Juan Luis Guzmán presents to the clinic with Rash (Has a rash on his right groin,started about a month ago.)     Rash  This is a recurrent problem. The current episode started more than 1 month ago. The problem has been gradually worsening since onset. Location: right groin region. The rash is characterized by itchiness. It is unknown if there was an exposure to a precipitant. Pertinent negatives include no diarrhea, fever or vomiting. Past treatments include topical steroids and anti-itch cream. The treatment provided no relief.       Review of Systems     Review of Systems   Constitutional:  Negative for fever and unexpected weight change.   Gastrointestinal:  Negative for diarrhea, nausea and vomiting.   Genitourinary:  Positive for genital sores. Negative for decreased urine volume, discharge, penile pain, penile swelling and urgency.        Reports wart like lesion to right side of penis that has been present for about one year   Integumentary:  Positive for rash.        Medical / Social / Family History     Past Medical History:   Diagnosis Date    Seizures        Past Surgical History:   Procedure Laterality Date    FACIAL RECONSTRUCTION SURGERY         Social History  Mr. Guzmán  reports that he has been smoking cigarettes. He has never used smokeless tobacco. He reports that he does not  currently use alcohol. He reports that he does not use drugs.    Family History  Mr. Guzmán's family history is not on file.    Medications and Allergies     Medications  No outpatient medications have been marked as taking for the 3/5/24 encounter (Office Visit) with Rafia Farrell FNP.       Allergies  Review of patient's allergies indicates:  No Known Allergies    Physical Examination     Vitals:    03/05/24 0941   BP: 126/80   Pulse: 78   Resp: 16   Temp: 98.1 °F (36.7 °C)     Physical Exam  Vitals and nursing note reviewed.   Constitutional:       Appearance: Normal appearance.   HENT:      Head: Normocephalic and atraumatic.   Eyes:      Conjunctiva/sclera: Conjunctivae normal.      Pupils: Pupils are equal, round, and reactive to light.   Cardiovascular:      Rate and Rhythm: Normal rate and regular rhythm.      Pulses: Normal pulses.      Heart sounds: Normal heart sounds. No murmur heard.  Pulmonary:      Effort: Pulmonary effort is normal. No respiratory distress.      Breath sounds: Normal breath sounds.   Abdominal:      General: Bowel sounds are normal.      Palpations: Abdomen is soft.   Musculoskeletal:      Cervical back: Normal range of motion and neck supple.   Skin:     General: Skin is warm and dry.      Capillary Refill: Capillary refill takes 2 to 3 seconds.      Coloration: Skin is not jaundiced.      Comments: There is a moist, erythematous skin disruption present to right groin area with excoriation present; there is a wart-appearing lesion present to right side of penis without erythema or drainage present.    Neurological:      Mental Status: He is alert and oriented to person, place, and time.      Comments: Ambulates without difficulty   Psychiatric:         Mood and Affect: Mood normal.         Behavior: Behavior normal.         Thought Content: Thought content normal.         Judgment: Judgment normal.          Lab Results   Component Value Date    WBC 11.47 (H) 09/12/2022    HGB  "16.8 09/12/2022    HCT 47.4 09/12/2022    MCV 90.3 09/12/2022     09/12/2022        Sodium   Date Value Ref Range Status   09/12/2022 139 136 - 145 mmol/L Final     Potassium   Date Value Ref Range Status   09/12/2022 4.4 3.5 - 5.1 mmol/L Final     Chloride   Date Value Ref Range Status   09/12/2022 104 98 - 107 mmol/L Final     CO2   Date Value Ref Range Status   09/12/2022 27 21 - 32 mmol/L Final     Glucose   Date Value Ref Range Status   09/12/2022 119 (H) 74 - 106 mg/dL Final     BUN   Date Value Ref Range Status   09/12/2022 13 7 - 18 mg/dL Final     Creatinine   Date Value Ref Range Status   09/12/2022 1.29 0.70 - 1.30 mg/dL Final     Calcium   Date Value Ref Range Status   09/12/2022 9.2 8.5 - 10.1 mg/dL Final     Total Protein   Date Value Ref Range Status   09/12/2022 7.4 6.4 - 8.2 g/dL Final     Albumin   Date Value Ref Range Status   09/12/2022 4.1 3.5 - 5.0 g/dL Final     Bilirubin, Total   Date Value Ref Range Status   09/12/2022 0.6 >0.0 - 1.2 mg/dL Final     Alk Phos   Date Value Ref Range Status   09/12/2022 81 45 - 115 U/L Final     AST   Date Value Ref Range Status   09/12/2022 18 15 - 37 U/L Final     ALT   Date Value Ref Range Status   09/12/2022 33 16 - 61 U/L Final     Anion Gap   Date Value Ref Range Status   09/12/2022 12 7 - 16 mmol/L Final     eGFR   Date Value Ref Range Status   09/12/2022 72 >=60 mL/min/1.73m² Final      No results found for: "LABA1C", "HGBA1C"   No results found for: "CHOL"  No results found for: "HDL"  No results found for: "LDLCALC"  No results found for: "DLDL"  No results found for: "TRIG"  No results found for: "CHOLHDL"   No results found for: "TSH", "D2UWCVN", "V0EDGTV", "THYROIDAB", "FREET4"     Assessment and Plan (including Health Maintenance)      Problem List  Smart Sets  Document Outside HM   :    Plan:     1. Tinea cruris    2. Penile wart         There are no Patient Instructions on file for this visit.     Health Maintenance Due   Topic Date Due "    Hepatitis C Screening  Never done    Lipid Panel  Never done    COVID-19 Vaccine (1) Never done    Pneumococcal Vaccines (Age 0-64) (1 of 2 - PCV) Never done    HIV Screening  Never done    TETANUS VACCINE  Never done    Hemoglobin A1c (Diabetic Prevention Screening)  Never done    Influenza Vaccine (1) Never done         The patient has no Health Maintenance topics of status Not Due    No future appointments.     To health department today at 1300 for further evaluation of penile lesion and complete STI workup/treatment.     He is to d/c steroid topical treatment to area of tinea cruris, keep area dry at all times. Return to clinic for persistent or worsening of s/s. Patient voiced understanding of teaching.         Signature:  JOSÉ ANTONIO Rosas  [facility]  [address] [facility] [address]  [phone]    Date of encounter: 3/5/24

## 2024-04-22 ENCOUNTER — HOSPITAL ENCOUNTER (EMERGENCY)
Facility: HOSPITAL | Age: 44
Discharge: HOME | End: 2024-04-22

## 2024-04-22 VITALS
OXYGEN SATURATION: 98 % | BODY MASS INDEX: 25.61 KG/M2 | TEMPERATURE: 98 F | HEIGHT: 68 IN | WEIGHT: 169 LBS | RESPIRATION RATE: 20 BRPM | DIASTOLIC BLOOD PRESSURE: 84 MMHG | SYSTOLIC BLOOD PRESSURE: 145 MMHG | HEART RATE: 104 BPM

## 2024-04-22 DIAGNOSIS — S92.425A CLOSED NONDISPLACED FRACTURE OF DISTAL PHALANX OF LEFT GREAT TOE, INITIAL ENCOUNTER: Primary | ICD-10-CM

## 2024-04-22 PROCEDURE — 25000003 PHARM REV CODE 250: Performed by: NURSE PRACTITIONER

## 2024-04-22 PROCEDURE — 99284 EMERGENCY DEPT VISIT MOD MDM: CPT | Mod: ,,, | Performed by: NURSE PRACTITIONER

## 2024-04-22 PROCEDURE — 99284 EMERGENCY DEPT VISIT MOD MDM: CPT | Mod: 25

## 2024-04-22 PROCEDURE — 90471 IMMUNIZATION ADMIN: CPT | Performed by: NURSE PRACTITIONER

## 2024-04-22 PROCEDURE — 90715 TDAP VACCINE 7 YRS/> IM: CPT | Performed by: NURSE PRACTITIONER

## 2024-04-22 PROCEDURE — 63600175 PHARM REV CODE 636 W HCPCS: Performed by: NURSE PRACTITIONER

## 2024-04-22 RX ORDER — CLINDAMYCIN HYDROCHLORIDE 150 MG/1
300 CAPSULE ORAL 4 TIMES DAILY
Qty: 56 CAPSULE | Refills: 0 | Status: SHIPPED | OUTPATIENT
Start: 2024-04-22 | End: 2024-04-29

## 2024-04-22 RX ORDER — IBUPROFEN 200 MG
600 TABLET ORAL
Status: COMPLETED | OUTPATIENT
Start: 2024-04-22 | End: 2024-04-22

## 2024-04-22 RX ADMIN — IBUPROFEN 600 MG: 200 TABLET, FILM COATED ORAL at 12:04

## 2024-04-22 RX ADMIN — TETANUS TOXOID, REDUCED DIPHTHERIA TOXOID AND ACELLULAR PERTUSSIS VACCINE, ADSORBED 0.5 ML: 5; 2.5; 8; 8; 2.5 SUSPENSION INTRAMUSCULAR at 12:04

## 2024-04-22 NOTE — ED PROVIDER NOTES
Encounter Date: 4/22/2024       History     Chief Complaint   Patient presents with    Foot Injury     Patient presents to the ED with complaints of left foot pain after he was changing a tire and his foot got smashed. Reports it is primarily his toes. Denies any other injury.     The history is provided by the patient.     Review of patient's allergies indicates:  No Known Allergies  Past Medical History:   Diagnosis Date    Seizures      Past Surgical History:   Procedure Laterality Date    FACIAL RECONSTRUCTION SURGERY       No family history on file.  Social History     Tobacco Use    Smoking status: Every Day     Current packs/day: 0.25     Types: Cigarettes    Smokeless tobacco: Never   Substance Use Topics    Alcohol use: Not Currently     Comment: occasionally    Drug use: Never     Review of Systems   Constitutional: Negative.    Respiratory: Negative.     Cardiovascular: Negative.    Musculoskeletal:         Left foot pain   Neurological: Negative.    Psychiatric/Behavioral: Negative.     All other systems reviewed and are negative.      Physical Exam     Initial Vitals [04/22/24 0003]   BP Pulse Resp Temp SpO2   (!) 145/84 104 20 97.9 °F (36.6 °C) 98 %      MAP       --         Physical Exam    Vitals reviewed.  Constitutional: He appears well-developed and well-nourished.   Cardiovascular:  Normal rate, regular rhythm, normal heart sounds and intact distal pulses.           Pulmonary/Chest: Breath sounds normal.   Musculoskeletal:         General: Tenderness (left toe) and edema (left greater toe) present.     Neurological: He is alert and oriented to person, place, and time. He has normal strength. GCS score is 15. GCS eye subscore is 4. GCS verbal subscore is 5. GCS motor subscore is 6.   Skin: Skin is warm and dry. Capillary refill takes less than 2 seconds.        Psychiatric: He has a normal mood and affect. His behavior is normal. Judgment and thought content normal.         Medical Screening Exam    See Full Note    ED Course   Procedures  Labs Reviewed - No data to display       Imaging Results              X-Ray Toe 2 or More Views Left (In process)                      Medications   ibuprofen tablet 600 mg (has no administration in time range)   Tdap (BOOSTRIX) vaccine injection 0.5 mL (0.5 mLs Intramuscular Given 4/22/24 0021)     Medical Decision Making  MDM    Patient presents for emergent evaluation of acute left foot/toe pain/injury that poses a threat to life and/or bodily function.    In the ED patient found to have acute left greater toe fracture.    I ordered X-rays and personally reviewed them and reviewed the radiologist interpretation.  Xray significant for left greater toe fracture.      Discharge MDM  I discussed the treatment and discharge plan with the patient, patient advised on wound care and follow up with orthopedics  Patient was managed in the ED with Motrin.    The response to treatment was good.    Patient was discharged in stable condition.  Detailed return precautions discussed.    Amount and/or Complexity of Data Reviewed  Radiology: ordered.                                      Clinical Impression:   Final diagnoses:  [S92.425A] Closed nondisplaced fracture of distal phalanx of left great toe, initial encounter (Primary)        ED Disposition Condition    Discharge Stable          ED Prescriptions    None       Follow-up Information       Follow up With Specialties Details Why Contact Info    Ken Rollins MD Orthopedic Surgery   [address]   [address] [place] [address]  [phone]               Laura Mnazano, [facility]  04/22/24 0036

## 2024-04-22 NOTE — Clinical Note
"Juan Luis Sanchez"Arielle was seen and treated in our emergency department on 4/22/2024.  He may return to work on 04/24/2024.       If you have any questions or concerns, please don't hesitate to call.      Laura Manzano, JOSÉ ANTONIO"

## 2024-06-19 ENCOUNTER — HOSPITAL ENCOUNTER (EMERGENCY)
Facility: HOSPITAL | Age: 44
Discharge: HOME | End: 2024-06-19

## 2024-06-19 VITALS
RESPIRATION RATE: 18 BRPM | SYSTOLIC BLOOD PRESSURE: 131 MMHG | HEART RATE: 88 BPM | OXYGEN SATURATION: 96 % | WEIGHT: 160 LBS | TEMPERATURE: 98 F | BODY MASS INDEX: 24.25 KG/M2 | DIASTOLIC BLOOD PRESSURE: 94 MMHG | HEIGHT: 68 IN
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
## 2024-06-19 DIAGNOSIS — R07.81 RIB PAIN ON LEFT SIDE: ICD-10-CM

## 2024-06-19 DIAGNOSIS — S20.212A CHEST WALL CONTUSION, LEFT, INITIAL ENCOUNTER: Primary | ICD-10-CM

## 2024-06-19 PROCEDURE — 99284 EMERGENCY DEPT VISIT MOD MDM: CPT | Mod: ,,,

## 2024-06-19 PROCEDURE — 63600175 PHARM REV CODE 636 W HCPCS

## 2024-06-19 PROCEDURE — 99284 EMERGENCY DEPT VISIT MOD MDM: CPT | Mod: 25

## 2024-06-19 PROCEDURE — 96372 THER/PROPH/DIAG INJ SC/IM: CPT

## 2024-06-19 RX ORDER — METHOCARBAMOL 500 MG/1
500 TABLET, FILM COATED ORAL 3 TIMES DAILY
Qty: 15 TABLET | Refills: 0 | Status: SHIPPED | OUTPATIENT
Start: 2024-06-19 | End: 2024-06-24

## 2024-06-19 RX ORDER — KETOROLAC TROMETHAMINE 30 MG/ML
30 INJECTION, SOLUTION INTRAMUSCULAR; INTRAVENOUS
Status: COMPLETED | OUTPATIENT
Start: 2024-06-19 | End: 2024-06-19

## 2024-06-19 RX ADMIN — KETOROLAC TROMETHAMINE 30 MG: 30 INJECTION, SOLUTION INTRAMUSCULAR at 08:06

## 2024-06-19 NOTE — DISCHARGE INSTRUCTIONS
Use incentive spirometer as directed.  Continue ibuprofen 800 mg 3 times daily with food.  Take muscle relaxers as prescribed.  Obtain plenty of rest. Follow up with the primary care provider in 1-2 days for a recheck.  Return to emerge department for any new or worrisome symptoms.

## 2024-06-19 NOTE — ED PROVIDER NOTES
Encounter Date: 6/19/2024       History     Chief Complaint   Patient presents with    Rib Injury     Pain left side of ribs, hit on guardrail while moving dresser 1  1/2 weeks ago     Patient is a 43-year-old male who presents to emergency department complaints of left rib pain.  He reports that he had a slip and fall into the guard rail of the stairs 1.5 weeks prior.  He has been treating at home with ibuprofen and reports no improvement.  Also reports that he went to work today and was instructed by his employer to be checked out before returning to work.  He reports pain on deep inspiration and palpation denies shortness of breath, weakness, numbness, dizziness, diaphoresis, or any other complaints.  Blood pressure is 154/97, temperature 97.7°, heart rate 89, respirations 18, and oxygen saturation 99% on room air.  He was speaking in full sentences and able to provide an adequate history.  He appears in no immediate distress.    The history is provided by the patient.     Review of patient's allergies indicates:  No Known Allergies  Past Medical History:   Diagnosis Date    Seizures      Past Surgical History:   Procedure Laterality Date    FACIAL RECONSTRUCTION SURGERY       No family history on file.  Social History     Tobacco Use    Smoking status: Every Day     Current packs/day: 0.25     Types: Cigarettes    Smokeless tobacco: Never   Substance Use Topics    Alcohol use: Not Currently     Comment: occasionally    Drug use: Never     Review of Systems   Constitutional:  Negative for activity change, appetite change, chills and fever.   HENT:  Negative for congestion, sore throat and voice change.    Eyes: Negative.    Respiratory:  Negative for cough, shortness of breath, wheezing and stridor.    Cardiovascular:  Positive for chest pain (left lateral ribs). Negative for palpitations.   Gastrointestinal:  Negative for abdominal distention, abdominal pain, diarrhea, nausea and vomiting.   Endocrine: Negative.     Genitourinary:  Negative for difficulty urinating, dysuria and frequency.   Musculoskeletal:  Positive for arthralgias. Negative for back pain, neck pain and neck stiffness.   Skin:  Negative for color change, pallor and rash.   Allergic/Immunologic: Negative.    Neurological:  Negative for dizziness, syncope, speech difficulty, weakness, light-headedness and headaches.   Hematological:  Does not bruise/bleed easily.   Psychiatric/Behavioral:  Negative for confusion. The patient is not nervous/anxious.    All other systems reviewed and are negative.      Physical Exam     Initial Vitals   BP Pulse Resp Temp SpO2   06/19/24 0839 06/19/24 0839 06/19/24 0839 06/19/24 0846 06/19/24 0839   (!) 154/97 89 18 97.7 °F (36.5 °C) 99 %      MAP       --                Physical Exam    Nursing note and vitals reviewed.  Constitutional: He appears well-developed and well-nourished. He is not diaphoretic. No distress.   HENT:   Head: Normocephalic and atraumatic.   Mouth/Throat: Oropharynx is clear and moist.   Eyes: Conjunctivae and EOM are normal. Pupils are equal, round, and reactive to light.   Neck: Neck supple.   Normal range of motion.  Cardiovascular:  Normal rate, regular rhythm and normal heart sounds.           Pulmonary/Chest: Effort normal and breath sounds normal. No tachypnea. No respiratory distress. He has no decreased breath sounds. He has no wheezes. He has no rhonchi. He has no rales.         He exhibits no tenderness.   Abdominal: Abdomen is soft. Bowel sounds are normal. He exhibits no distension. There is no abdominal tenderness. There is no rebound and no guarding.   Musculoskeletal:         General: Normal range of motion.      Cervical back: Normal range of motion and neck supple.     Neurological: He is alert and oriented to person, place, and time. He has normal strength. GCS score is 15. GCS eye subscore is 4. GCS verbal subscore is 5. GCS motor subscore is 6.   Skin: Skin is warm and dry. Capillary  refill takes less than 2 seconds.   Psychiatric: He has a normal mood and affect. His behavior is normal. Judgment and thought content normal.         Medical Screening Exam   See Full Note    ED Course   Procedures  Labs Reviewed - No data to display       Imaging Results              XR Ribs Min 3 views w/PA Chest Left (Final result)  Result time 06/19/24 09:15:48      Final result by Eduin Urbina MD (06/19/24 09:15:48)                   Impression:      No acute findings.      Electronically signed by: Eduin Urbina  Date:    06/19/2024  Time:    09:15               Narrative:    EXAMINATION:  XR RIBS MIN 3 VIEWS W/ PA CHEST LEFT    CLINICAL HISTORY:  chest contusion;    TECHNIQUE:  PA chest, three views left ribs    COMPARISON:  None    FINDINGS:  Heart size normal.  The lungs appear clear.  No pneumothorax.  No rib fracture is identified.                                       Medications   ketorolac injection 30 mg (30 mg Intramuscular Given 6/19/24 0856)     Medical Decision Making  Patient presents to the ED with complaints of left-sided rib pain.  He was alert and oriented x4.  GCS 15.  Blood pressure elevated at 154/97 but vitals within normal limits otherwise.  He was not tachypneic.  He was speaking in full sentences.  Tender to palpation but no other visible trauma.  Breath sounds are equal and clear bilaterally.  He was a current smoker.  We will provide Toradol 30 mg IM for pain control for perform a chest x-ray with left rib detail for further evaluation.    Amount and/or Complexity of Data Reviewed  Independent Historian:      Details: Patient is a 43-year-old male who presents to emergency department complaints of left rib pain.  He reports that he had a slip and fall into the guard rail of the stairs 1.5 weeks prior.  He has been treating at home with ibuprofen and reports no improvement.  Also reports that he went to work today and was instructed by his employer to be checked out before returning to  work.  He reports pain on deep inspiration and palpation denies shortness of breath, weakness, numbness, dizziness, diaphoresis, or any other complaints.  Blood pressure is 154/97, temperature 97.7°, heart rate 89, respirations 18, and oxygen saturation 99% on room air.  He was speaking in full sentences and able to provide an adequate history.  He appears in no immediate distress.  Radiology: ordered.     Details: Chest x-ray with left rib detail shows no acute findings.    Risk  Prescription drug management.  Risk Details: Patient presents for emergent evaluation of acute left rib pain that poses a threat to life and/or bodily function.    Final diagnoses:  [S20.212A] Chest wall contusion, left, initial encounter (Primary)  [R07.81] Rib pain on left side  [W19.XXXA] Fall, initial encounter  It was not felt that labs would be beneficial at this time.  I ordered X-rays and personally reviewed them and reviewed the radiologist interpretation.  Xray significant for no acute process.      Patient was managed in the ED with Toradol 30 mg IM.  Incentive spirometer.  The response to treatment was improved.    Patient was discharged in stable condition.  Detailed return precautions discussed.                                       Clinical Impression:   Final diagnoses:  [S20.212A] Chest wall contusion, left, initial encounter (Primary)  [R07.81] Rib pain on left side  [W19.XXXA] Fall, initial encounter        ED Disposition Condition    Discharge Stable          ED Prescriptions       Medication Sig Dispense Start Date End Date Auth. Provider    methocarbamoL (ROBAXIN) 500 MG Tab Take 1 tablet (500 mg total) by mouth 3 (three) times daily. for 5 days 15 tablet 6/19/2024 6/24/2024 Venu Guillen FNP          Follow-up Information       Follow up With Specialties Details Why Contact Info    Tanja [place] II, MD Family Medicine Call today Arrange for a follow up in 1-2 days for a recheck. [address] [place] [address]  [facility] [address]  [phone]               Venu Guillen, [facility]  06/19/24 0933

## 2024-06-19 NOTE — Clinical Note
"Juan Luis Sanchez" Arielle was seen and treated in our emergency department on 6/19/2024.  He may return to work on 06/22/2024.       If you have any questions or concerns, please don't hesitate to call.      Venu Guillen, FNP"

## 2025-07-13 ENCOUNTER — HOSPITAL ENCOUNTER (EMERGENCY)
Facility: HOSPITAL | Age: 45
Discharge: HOME | End: 2025-07-13
Payer: COMMERCIAL

## 2025-07-13 VITALS
RESPIRATION RATE: 18 BRPM | HEIGHT: 68 IN | BODY MASS INDEX: 26.98 KG/M2 | TEMPERATURE: 98 F | HEART RATE: 90 BPM | DIASTOLIC BLOOD PRESSURE: 88 MMHG | WEIGHT: 178 LBS | SYSTOLIC BLOOD PRESSURE: 130 MMHG | OXYGEN SATURATION: 95 %

## 2025-07-13 DIAGNOSIS — S22.31XA CLOSED FRACTURE OF ONE RIB OF RIGHT SIDE, INITIAL ENCOUNTER: Primary | ICD-10-CM
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
## 2025-07-13 DIAGNOSIS — S20.211A CHEST WALL CONTUSION, RIGHT, INITIAL ENCOUNTER: ICD-10-CM

## 2025-07-13 PROCEDURE — 96372 THER/PROPH/DIAG INJ SC/IM: CPT

## 2025-07-13 PROCEDURE — 99284 EMERGENCY DEPT VISIT MOD MDM: CPT | Mod: ,,,

## 2025-07-13 PROCEDURE — 63600175 PHARM REV CODE 636 W HCPCS

## 2025-07-13 PROCEDURE — 99900035 HC TECH TIME PER 15 MIN (STAT)

## 2025-07-13 RX ORDER — HYDROCODONE BITARTRATE AND ACETAMINOPHEN 5; 325 MG/1; MG/1
1 TABLET ORAL EVERY 6 HOURS PRN
Qty: 12 TABLET | Refills: 0 | Status: SHIPPED | OUTPATIENT
Start: 2025-07-13

## 2025-07-13 RX ORDER — KETOROLAC TROMETHAMINE 30 MG/ML
30 INJECTION, SOLUTION INTRAMUSCULAR; INTRAVENOUS
Status: COMPLETED | OUTPATIENT
Start: 2025-07-13 | End: 2025-07-13

## 2025-07-13 RX ORDER — DEXAMETHASONE SODIUM PHOSPHATE 4 MG/ML
8 INJECTION, SOLUTION INTRA-ARTICULAR; INTRALESIONAL; INTRAMUSCULAR; INTRAVENOUS; SOFT TISSUE
Status: COMPLETED | OUTPATIENT
Start: 2025-07-13 | End: 2025-07-13

## 2025-07-13 RX ADMIN — KETOROLAC TROMETHAMINE 30 MG: 30 INJECTION, SOLUTION INTRAMUSCULAR at 11:07

## 2025-07-13 RX ADMIN — DEXAMETHASONE SODIUM PHOSPHATE 8 MG: 4 INJECTION, SOLUTION INTRA-ARTICULAR; INTRALESIONAL; INTRAMUSCULAR; INTRAVENOUS; SOFT TISSUE at 11:07

## 2025-07-13 NOTE — DISCHARGE INSTRUCTIONS
Take Norco as prescribed for pain control.  You may alternate with over-the-counter NSAID medication as discussed.  Splint your right chest as discussed with a pillow especially during coughing, sneezing, or deep breathing episodes.  Use incentive spirometry device as instructed to help prevent pneumonia.  Follow up with the primary care provider in 1-2 days for a recheck.  Return to the ED for difficulty breathing, persistent shortness of breath, uncontrolled pain, fever, or any other new or worrisome symptoms.

## 2025-07-13 NOTE — ED PROVIDER NOTES
Encounter Date: 7/13/2025       History     Chief Complaint   Patient presents with    Fall     Rib pain after fall and hit boat ramp yesterday     44-year-old male presents to the ED with complaints of right rib pain after a fall 5-6 days prior.  [place] and [place] onto his right side.  Denies head injury or LOC. only complaint is right-sided rib pain that is worse with ROM and deep inspiration.  Denies persistent shortness of breath but does complain of sensation of shortness of breath during cough and painful episodes.  Denies fever, chills, vomiting, abdominal pain, or any other complaints at this time.  Pain is described as sharp and radiating around his ribs to the right side of his back.  He has been treating at home with ibuprofen and previously prescribed hydrocodone.  Last dose of hydrocodone was yesterday but he does report improvement with this medication.  Blood pressure 124/103, temperature 97.7°, heart rate 109, respirations 18, and oxygen saturation 99% on room air.  Appears in no immediate distress.    The history is provided by the patient.     Review of patient's allergies indicates:  No Known Allergies  Past Medical History:   Diagnosis Date    Seizures      Past Surgical History:   Procedure Laterality Date    FACIAL RECONSTRUCTION SURGERY       No family history on file.  Social History[1]  Review of Systems   Constitutional:  Negative for activity change, appetite change and fever.   HENT:  Negative for congestion and sore throat.    Eyes: Negative.    Respiratory:  Negative for cough and shortness of breath.    Cardiovascular:  Positive for chest pain (right sided rib pain). Negative for palpitations.   Gastrointestinal:  Negative for abdominal pain, nausea and vomiting.   Endocrine: Negative.    Genitourinary:  Negative for dysuria and frequency.   Musculoskeletal:  Positive for arthralgias and back pain (right side). Negative for neck pain  and neck stiffness.   Skin:  Negative for color change, pallor and rash.   Allergic/Immunologic: Negative.    Neurological:  Negative for dizziness, syncope, speech difficulty, weakness and headaches.   Hematological:  Does not bruise/bleed easily.   Psychiatric/Behavioral:  Negative for confusion. The patient is not nervous/anxious.    All other systems reviewed and are negative.      Physical Exam     Initial Vitals [07/13/25 1056]   BP Pulse Resp Temp SpO2   (!) 124/103 108 18 97.7 °F (36.5 °C) 99 %      MAP       --         Physical Exam    Nursing note and vitals reviewed.  Constitutional: He appears well-developed and well-nourished. He is not diaphoretic. He is active and cooperative.  Non-toxic appearance. No distress.   HENT:   Head: Normocephalic and atraumatic.   Right Ear: External ear normal.   Left Ear: External ear normal. Mouth/Throat: Oropharynx is clear and moist.   Eyes: Conjunctivae and EOM are normal. Pupils are equal, round, and reactive to light.   Neck: Neck supple.   Normal range of motion.   Full passive range of motion without pain.     Cardiovascular:  Normal rate, regular rhythm, S1 normal, S2 normal, normal heart sounds and normal pulses.           Pulmonary/Chest: Breath sounds normal. No tachypnea. No respiratory distress. He has no decreased breath sounds. He has no wheezes. He has no rhonchi. He has no rales. He exhibits tenderness.     Abdominal: Abdomen is soft. Bowel sounds are normal. He exhibits no distension. There is no abdominal tenderness. There is no rebound and no guarding.   Musculoskeletal:         General: Normal range of motion.      Cervical back: Normal, full passive range of motion without pain, normal range of motion and neck supple.      Thoracic back: Normal.      Lumbar back: Normal.        Back:      Neurological: He is alert and oriented to person, place, and time. He has normal strength. GCS score is 15. GCS eye subscore is 4. GCS verbal subscore is 5. GCS  motor subscore is 6.   Skin: Skin is warm and dry. Capillary refill takes less than 2 seconds.   Psychiatric: He has a normal mood and affect. His behavior is normal. Judgment and thought content normal.         Medical Screening Exam   See Full Note    ED Course   Procedures  Labs Reviewed - No data to display       Imaging Results              XR Ribs Min 3 Views w/PA Chest Right (Final result)  Result time 07/13/25 11:34:29      Final result by Cecilio Whitt MD (07/13/25 11:34:29)                   Impression:      No convincing evidence of acute pulmonary disease.    Question of nondisplaced fracture involving the right anterior 7th rib.  Correlation with physical examination recommended.      Electronically signed by: Cecilio Whitt  Date:    07/13/2025  Time:    11:34               Narrative:    EXAMINATION:  XR RIBS MIN 3 VIEWS W/ PA CHEST RIGHT    CLINICAL HISTORY:  right rib injury;    TECHNIQUE:  PA chest and five views of the right ribs.    COMPARISON:  Chest radiograph performed 06/19/2024.    FINDINGS:  Cardiomediastinal contours appear grossly within normal limits    Lungs essentially clear.  No definite pneumothorax or large volume pleural effusion.    No acute findings in the visualized abdomen.    Question of nondisplaced fracture involving the right anterior 7th rib.                                       Medications   ketorolac injection 30 mg (30 mg Intramuscular Given 7/13/25 1129)   dexAMETHasone injection 8 mg (8 mg Intramuscular Given 7/13/25 1128)     Medical Decision Making  Presents with complaints of right rib pain after a fall 5-6 days prior.  A&O x4.  GCS 15.  Vital signs stable.  Oxygen saturation 99% on room air.  Speaking in full sentences and able to provide an adequate history.  Pain is located in the right anterolateral ribs and radiates around his side.  Sharp in nature.  Reproducible on palpation.  Worse with ROM, cough, or deep inspiration.  Suspect rib fracture, rib  contusion, and/or costochondritis.  We will perform chest x-ray with right rib detail.  Decadron 8 mg IM and Toradol 30 mg IM for pain and inflammation.  Offered additional Norco for pain but he reports he has been have a  and prefers to  Rx instead.    Amount and/or Complexity of Data Reviewed  Independent Historian:      Details: 44-year-old male presents to the ED with complaints of right rib pain after a fall 5-6 days prior.  [place] and [place] onto his right side.  Denies head injury or LOC. only complaint is right-sided rib pain that is worse with ROM and deep inspiration.  Denies persistent shortness of breath but does complain of sensation of shortness of breath during cough and painful episodes.  Denies fever, chills, vomiting, abdominal pain, or any other complaints at this time.  Pain is described as sharp and radiating around his ribs to the right side of his back.  He has been treating at home with ibuprofen and previously prescribed hydrocodone.  Last dose of hydrocodone was yesterday but he does report improvement with this medication.  Blood pressure 124/103, temperature 97.7°, heart rate 109, respirations 18, and oxygen saturation 99% on room air.  Appears in no immediate distress.  Radiology: ordered.     Details: Chest x-ray:    No convincing evidence of acute pulmonary disease.  Question of nondisplaced fracture involving the right anterior 7th rib.  Correlation with physical examination recommended.    Risk  Prescription drug management.  Risk Details: Patient presents for emergent evaluation of acute right rib injury that poses a threat to life and/or bodily function.    Final diagnoses:  [S22.31XA] Closed fracture of one rib of right side, initial encounter (Primary)  [S20.211A] Chest wall contusion, right, initial encounter  [W19.XXXA] Fall, initial encounter  I ordered X-rays and personally reviewed them and reviewed the  radiologist interpretation.  Diagnosis, results, home care, follow up, medication information, splinting recommendations, and incentive spirometry as well as very strict ED return precautions explained in detail.  We did explain that today's visit is only a snap shot in time and things could certainly change or worrisome which should prompt him to return to the ED for repeat evaluation at that time.  Incentive spirometer device supplied the patient and instructed on use.  All questions answered.  We will provide written instructions as well.  He verbalizes understanding and is agreement with this plan.  Patient was managed in the ED with IM Toradol and Decadron.    The response to treatment was improved.    Patient was discharged in stable condition.  Detailed return precautions discussed.                                       Clinical Impression:   Final diagnoses:  [S22.31XA] Closed fracture of one rib of right side, initial encounter (Primary)  [S20.211A] Chest wall contusion, right, initial encounter  [W19.XXXA] Fall, initial encounter        ED Disposition Condition    Discharge Stable          ED Prescriptions       Medication Sig Dispense Start Date End Date Auth. Provider    HYDROcodone-acetaminophen (NORCO) 5-325 mg per tablet Take 1 tablet by mouth every 6 (six) hours as needed for Pain. 12 tablet 7/13/2025 -- Venu Guillen FNP          Follow-up Information       Follow up With Specialties Details Why Contact Info    Alexei Mancera FNP[facility] on 7/14/2025  [address] [place] [id number] [phone]                   [1]   Social History  Tobacco Use    Smoking status: Every Day     Current packs/day: 0.25     Types: Cigarettes    Smokeless tobacco: Never   Substance Use Topics    Alcohol use: Not Currently     Comment: occasionally    Drug use: Never        Venu Guillen FNP  07/13/25 9835